# Patient Record
(demographics unavailable — no encounter records)

---

## 2019-01-29 NOTE — CR
EXAMINATION: Two-view chest (AP and Lateral views).

 

HISTORY: Shortness of breath.

 

FINDINGS: 

The trachea is midline. The cardiomediastinal silhouette is within normal

limits. No pulmonary infiltrates, effusions or pneumothorax. There is a round

metallic object projecting at the upper esophageal sphincter.

 

Osseous structures appear unremarkable.

 

IMPRESSION: 

 

1. There is likely a coin lodged within the upper esophageal sphincter.

2. The lungs are otherwise clear.

## 2019-01-29 NOTE — EDM.PDOC
ED HPI GENERAL MEDICAL PROBLEM





- General


Chief Complaint: Respiratory Problem


Stated Complaint: FEVER COUGH


Time Seen by Provider: 01/29/19 12:49


Source of Information: Reports: Patient


History Limitations: Reports: No Limitations





- History of Present Illness


INITIAL COMMENTS - FREE TEXT/NARRATIVE: 


PEDS HISTORY AND PHYSICAL:





History of present illness:


Patient is a 2 year 5-month-old female who is brought to the emergency room by 

her mother with concerns of fever, cough and vomiting 5 days. Mom states that 

she has had a subjective fever and persistent cough. She has had vomiting with 

eating and is concerned she could be dehydrated. Mom denies any abdominal pain, 

diarrhea, constipation or dysuria. Childhood immunizations are up-to-date.





Review of systems: 


As per history of present illness and below otherwise all systems reviewed and 

negative.





Past medical history: 


As per history of present illness and as reviewed below otherwise 

noncontributory.





Surgical history: 


As per history of present illness and as reviewed below otherwise 

noncontributory.





Social history: 


No reported history of drug or alcohol abuse.





Family history: 


As per history of present illness and as reviewed below otherwise 

noncontributory.





Physical exam:


General: Well-developed and well nourished 2 year 5-month-old female. Alert and 

appropriate for age. Nontoxic appearing and in no acute distress.


HEENT: Atraumatic, normocephalic, pupils reactive, negative for conjunctival 

pallor or scleral icterus, mucous membranes moist, throat clear, neck supple, 

nontender, trachea midline.  TMs normal bilaterally, no cervical adenopathy or 

nuchal rigidity.  


Lungs: Clear to auscultation, breath sounds equal bilaterally, chest nontender. 

No retractions noted. No work of breathing. Dry cough noted.


Heart: S1S2, regular rate and rhythm, no overt murmurs


Abdomen: Soft, nondistended, nontender. Negative for masses or 

hepatosplenomegaly. Normal abdominal bowel sounds.  


Pelvis: Stable nontender.


Genitourinary: Deferred.


Rectal: Deferred.


Extremities: Atraumatic, full range of motion without defects or deficits. 

Neurovascular unremarkable.


Neuro: Awake, alert, and age appropriate. Cranial nerves II through XII 

unremarkable. Cerebellum unremarkable. Motor and sensory unremarkable 

throughout. Exam nonfocal.


Skin:  Normal turgor, no overt rash or lesions





Notes:


Negative influenza and RSV screening. Patient does appear to have an esophageal 

foreign body, appears to be a coin in the upper esophagus sphincter. Lung x-ray 

is otherwise unremarkable. Mom states that she "is always putting things in her 

mouth" but had not had expressed concern of swallowing anything. She is 

breathing appropriately without any difficulty or retractions.





1400: [facility] in [place] consulted. Unable to get a hold of the general surgeon 

at this time to see if they do pediatric cases.





1420: [facility], Dr Sandeep Ambrose, Pediatric General Surgeon, is agreeable to 

accepting this patient. He is requesting that they facilitate flight transfer 

from [place]. The mother and patient were made aware of this and are agreeable to 

plan of care. Patient continues to breathe easily and is interactive/playful 

with mother. Reminded them to be nothing by mouth until cleared by the surgeon.





Diagnostics:


Influenza, RSV, CXR





Therapeutics:


Saline lock





Prescription:


None





Impression: 


Esophageal foreign body





Plan:


[facility] for pediatric general surgeon for further management and care. 





Definitive disposition and diagnosis as appropriate pending reevaluation and 

review of above.


Duration: Day(s):





- Related Data


 Allergies











Allergy/AdvReac Type Severity Reaction Status Date / Time


 


No Known Allergies Allergy   Verified 01/29/19 12:40











Home Meds: 


 Home Meds





. [No Known Home Meds]  01/29/19 [History]











Past Medical History





- Past Health History


Medical/Surgical History: Denies Medical/Surgical History





Social & Family History





- Family History


Family Medical History: Noncontributory





- Tobacco Use


Second Hand Smoke Exposure: No





ED ROS GENERAL





- Review of Systems


Review Of Systems: ROS reveals no pertinent complaints other than HPI.





ED EXAM, GENERAL





- Physical Exam


Exam: See Below (See dictation)





Course





- Vital Signs


Last Recorded V/S: 


 Last Vital Signs











Temp  98.7 F   01/29/19 12:38


 


Pulse  131 H  01/29/19 12:38


 


Resp  30   01/29/19 12:38


 


BP      


 


Pulse Ox  98   01/29/19 12:38














- Orders/Labs/Meds


Meds: 


Medications














Discontinued Medications














Generic Name Dose Route Start Last Admin





  Trade Name Freq  PRN Reason Stop Dose Admin


 


Ondansetron HCl  2 mg  01/29/19 13:29  01/29/19 14:36





  Zofran Odt  PO  01/29/19 13:30  Not Given





  ONETIME ONE   





     





     





     





     














Departure





- Departure


Time of Disposition: 15:09


Disposition: DC/Tfer to Acute Hospital 02


Clinical Impression: 


Esophageal foreign body


Qualifiers:


 Encounter type: initial encounter Qualified Code(s): T18.108A - Unspecified 

foreign body in esophagus causing other injury, initial encounter








- Discharge Information


Referrals: 


PCP,None [Primary Care Provider] - 


Forms:  ED Department Discharge